# Patient Record
Sex: MALE | Race: BLACK OR AFRICAN AMERICAN | Employment: FULL TIME | URBAN - METROPOLITAN AREA
[De-identification: names, ages, dates, MRNs, and addresses within clinical notes are randomized per-mention and may not be internally consistent; named-entity substitution may affect disease eponyms.]

---

## 2018-05-01 ENCOUNTER — TRANSFERRED RECORDS (OUTPATIENT)
Dept: HEALTH INFORMATION MANAGEMENT | Facility: CLINIC | Age: 33
End: 2018-05-01

## 2019-01-13 ENCOUNTER — HOSPITAL ENCOUNTER (EMERGENCY)
Facility: CLINIC | Age: 34
Discharge: HOME | End: 2019-01-13
Attending: EMERGENCY MEDICINE | Admitting: EMERGENCY MEDICINE
Payer: COMMERCIAL

## 2019-01-13 ENCOUNTER — APPOINTMENT (OUTPATIENT)
Dept: GENERAL RADIOLOGY | Facility: CLINIC | Age: 34
End: 2019-01-13
Payer: COMMERCIAL

## 2019-01-13 VITALS
RESPIRATION RATE: 19 BRPM | TEMPERATURE: 100 F | OXYGEN SATURATION: 96 % | SYSTOLIC BLOOD PRESSURE: 102 MMHG | DIASTOLIC BLOOD PRESSURE: 56 MMHG | BODY MASS INDEX: 19.64 KG/M2 | HEIGHT: 72 IN | WEIGHT: 145 LBS | HEART RATE: 85 BPM

## 2019-01-13 DIAGNOSIS — J18.9 PNEUMONIA OF RIGHT LOWER LOBE DUE TO INFECTIOUS ORGANISM: ICD-10-CM

## 2019-01-13 LAB
FLUAV+FLUBV AG SPEC QL: NEGATIVE
FLUAV+FLUBV AG SPEC QL: NEGATIVE
SPECIMEN SOURCE: NORMAL

## 2019-01-13 PROCEDURE — 99285 EMERGENCY DEPT VISIT HI MDM: CPT | Mod: 25

## 2019-01-13 PROCEDURE — 93005 ELECTROCARDIOGRAM TRACING: CPT

## 2019-01-13 PROCEDURE — 25000132 ZZH RX MED GY IP 250 OP 250 PS 637: Performed by: EMERGENCY MEDICINE

## 2019-01-13 PROCEDURE — 71046 X-RAY EXAM CHEST 2 VIEWS: CPT

## 2019-01-13 PROCEDURE — 87804 INFLUENZA ASSAY W/OPTIC: CPT | Performed by: EMERGENCY MEDICINE

## 2019-01-13 RX ORDER — LEVOFLOXACIN 750 MG/1
750 TABLET, FILM COATED ORAL DAILY
Qty: 5 TABLET | Refills: 0 | Status: SHIPPED | OUTPATIENT
Start: 2019-01-13 | End: 2019-01-18

## 2019-01-13 RX ORDER — IBUPROFEN 600 MG/1
600 TABLET, FILM COATED ORAL ONCE
Status: COMPLETED | OUTPATIENT
Start: 2019-01-13 | End: 2019-01-13

## 2019-01-13 RX ORDER — ACETAMINOPHEN 500 MG
1000 TABLET ORAL ONCE
Status: COMPLETED | OUTPATIENT
Start: 2019-01-13 | End: 2019-01-13

## 2019-01-13 RX ADMIN — ACETAMINOPHEN 1000 MG: 500 TABLET, FILM COATED ORAL at 10:39

## 2019-01-13 RX ADMIN — IBUPROFEN 600 MG: 600 TABLET ORAL at 09:47

## 2019-01-13 SDOH — HEALTH STABILITY: MENTAL HEALTH: HOW OFTEN DO YOU HAVE A DRINK CONTAINING ALCOHOL?: NEVER

## 2019-01-13 ASSESSMENT — ENCOUNTER SYMPTOMS
COUGH: 1
CHILLS: 1
ARTHRALGIAS: 1

## 2019-01-13 ASSESSMENT — MIFFLIN-ST. JEOR: SCORE: 1635.72

## 2019-01-13 NOTE — ED TRIAGE NOTES
Pt c/o cough for 2+ days, productive of yellow phlegm. Today at 0500 pt awoke, c/o severe right sided chest wall pain.

## 2019-01-13 NOTE — ED PROVIDER NOTES
History     Chief Complaint:  Cough     HPI   Steve Colon is a 33 year old male who presents to the Emergency Department today for evaluation of cough and rib pain. The patient reports that he woke up at 5 AM this morning with pain in his right side over his ribs. It hurt every time he took a breath. The area does not hurt to touch. He has had a cough for the last couple of days. This morning he was subjectively feverish and chilled. No rash. Patient [place] not traveled outside the country [place] and has been around passengers the last couple of days. No one around him is sick.    Allergies:  No known drug allergies    Medications:    The patient is not currently taking any prescribed medications.    Past Medical History:    The patient does not have any past pertinent medical history.    Past Surgical History:    History reviewed. No pertinent surgical history.    Family History:    History reviewed. No pertinent family history.     Social History:  Smoking status: Never smoker  Alcohol use: No  Marital Status:   [2]     Review of Systems   Constitutional: Positive for chills.   Respiratory: Positive for cough.    Musculoskeletal: Positive for arthralgias.   All other systems reviewed and are negative.      Physical Exam     Patient Vitals for the past 24 hrs:   BP Temp Temp src Pulse Resp SpO2 Height Weight   01/13/19 0940 109/64 100  F (37.8  C) Temporal 107 18 96 % 1.829 m (6') 65.8 kg (145 lb)       Physical Exam   Constitutional: He appears well-developed and well-nourished.   HENT:   Right Ear: External ear normal.   Left Ear: External ear normal.   Mouth/Throat: Oropharynx is clear and moist. No oropharyngeal exudate.   TM's clear bilaterally   Eyes: Conjunctivae are normal. Pupils are equal, round, and reactive to light. No scleral icterus.   Neck: Normal range of motion. Neck supple.   Cardiovascular: Normal rate, regular rhythm, normal heart sounds and intact distal  pulses. Exam reveals no gallop and no friction rub.   No murmur heard.  Pulmonary/Chest: Effort normal. No respiratory distress. He has no wheezes. He has no rales.   Slight dec BS on R lower lung field   Abdominal: Soft. Bowel sounds are normal. He exhibits no distension and no mass. There is no tenderness.   Musculoskeletal: He exhibits no edema.   Neurological: He is alert.   Skin: Skin is warm and dry. Capillary refill takes less than 2 seconds. No rash noted.   Psychiatric: He has a normal mood and affect.       Emergency Department Course     ECG (10:42:38):  Rate 82 bpm. UT interval 164. QRS duration 78. QT/QTc 356/415. P-R-T axes 79 11 60. Normal sinus rhythm with sinus arrhythmia. Normal ECG. Interpreted at 1038 by Pankaj Lara MD.    Imaging:  Radiographic findings were communicated with the patient who voiced understanding of the findings.  Chest XR, PA & LAT  IMPRESSION: There is subtle patchy airspace opacity in the right lower  lobe that may represent pneumonia. The lungs are otherwise clear.  There is no pleural effusion or pneumothorax. Heart size is normal. As read by radiology.    Laboratory:  Influenza A/B antigen: Negative    Interventions:  0947: Ibuprofen 600 MG PO  1039: Acetaminophen 1000 MG PO    Emergency Department Course:  Past medical records, nursing notes, and vitals reviewed.  1027: I performed an exam of the patient and obtained history, as documented above.    Influenza antigen tested.    The patient was sent for a chest x-ray while in the emergency department, findings above.    1118: I rechecked the patient. Explained findings to the patient. Patient's repeat heart rate ws 78.    Findings and plan explained to the Patient. Patient discharged home with instructions regarding supportive care, medications, and reasons to return. The importance of close follow-up was reviewed.      Impression & Plan      Medical Decision Making:  This 33 year old previously healthy male presents  with cough, fever, and right-sided chest pain. His ECG is normal. X-ray show right lower lobe pneumonia that would go along with his symptoms. He tested negative for influenza. I discussed antibiotic usage with the patient. We will go ahead and start him on Levaquin for 5 days. He is aware that he needs to use probiotics with the antibiotics. He was also made aware of the tendinopathy risks. He is otherwise healthy and not immunocompromised so I thought it was low risk but he is aware if he starts having any soreness he needs to stop right away and call his clinic to get followed up and get switched to different antibiotics. He otherwise has no respiratory distress or any deterioration. His heart rate is now 78 after Tylenol. He is asked to push fluids and rest. He is aware of tendonopathy risk for Levaquin. He is referred to [facility] for follow up.     Diagnosis:    ICD-10-CM   1. Pneumonia of right lower lobe due to infectious organism (H) J18.1     Disposition:  discharged to home    Discharge Medications:     Medication List      Started    levofloxacin 750 MG tablet  Commonly known as:  LEVAQUIN  750 mg, Oral, DAILY          Radha Franco  1/13/2019   [facility] EMERGENCY DEPARTMENT  Scribe Disclosure:  IRadha, am serving as a scribe at 10:27 AM on 1/13/2019 to document services personally performed by Pankaj Lara MD based on my observations and the provider's statements to me.        Pankaj Lara MD  01/13/19 7866

## 2019-01-13 NOTE — ED AVS SNAPSHOT
[facility] Emergency Department  [address]  [facility] [address]  Phone:  [phone]  Fax:  [phone]                                    Steve Colon   MRN: [id number]    Department:  [facility] Emergency Department   Date of Visit:  1/13/2019           After Visit Summary Signature Page    I have received my discharge instructions, and my questions have been answered. I have discussed any challenges I see with this plan with the nurse or doctor.    ..........................................................................................................................................  Patient/Patient Representative Signature      ..........................................................................................................................................  Patient Representative Print Name and Relationship to Patient    ..................................................               ................................................  Date                                   Time    ..........................................................................................................................................  Reviewed by Signature/Title    ...................................................              ..............................................  Date                                               Time          22EPIC Rev 08/18

## 2019-01-14 LAB — INTERPRETATION ECG - MUSE: NORMAL

## 2019-08-29 ENCOUNTER — HOSPITAL ENCOUNTER (EMERGENCY)
Facility: CLINIC | Age: 34
Discharge: HOME | End: 2019-08-29
Attending: EMERGENCY MEDICINE | Admitting: EMERGENCY MEDICINE
Payer: COMMERCIAL

## 2019-08-29 ENCOUNTER — APPOINTMENT (OUTPATIENT)
Dept: GENERAL RADIOLOGY | Facility: CLINIC | Age: 34
End: 2019-08-29
Attending: EMERGENCY MEDICINE
Payer: COMMERCIAL

## 2019-08-29 VITALS
RESPIRATION RATE: 16 BRPM | WEIGHT: 145 LBS | HEIGHT: 72 IN | DIASTOLIC BLOOD PRESSURE: 74 MMHG | OXYGEN SATURATION: 100 % | TEMPERATURE: 98.4 F | HEART RATE: 69 BPM | SYSTOLIC BLOOD PRESSURE: 110 MMHG | BODY MASS INDEX: 19.64 KG/M2

## 2019-08-29 DIAGNOSIS — M25.461 EFFUSION OF RIGHT KNEE: ICD-10-CM

## 2019-08-29 DIAGNOSIS — M25.561 ACUTE PAIN OF RIGHT KNEE: ICD-10-CM

## 2019-08-29 PROCEDURE — 99284 EMERGENCY DEPT VISIT MOD MDM: CPT

## 2019-08-29 PROCEDURE — 25000132 ZZH RX MED GY IP 250 OP 250 PS 637: Performed by: EMERGENCY MEDICINE

## 2019-08-29 PROCEDURE — 29505 APPLICATION LONG LEG SPLINT: CPT | Mod: RT

## 2019-08-29 PROCEDURE — 73562 X-RAY EXAM OF KNEE 3: CPT | Mod: RT

## 2019-08-29 RX ORDER — HYDROCODONE BITARTRATE AND ACETAMINOPHEN 5; 325 MG/1; MG/1
1-2 TABLET ORAL EVERY 6 HOURS PRN
Qty: 10 TABLET | Refills: 0 | Status: SHIPPED | OUTPATIENT
Start: 2019-08-29 | End: 2019-09-01

## 2019-08-29 RX ORDER — IBUPROFEN 600 MG/1
600 TABLET, FILM COATED ORAL ONCE
Status: COMPLETED | OUTPATIENT
Start: 2019-08-29 | End: 2019-08-29

## 2019-08-29 RX ORDER — HYDROCODONE BITARTRATE AND ACETAMINOPHEN 5; 325 MG/1; MG/1
2 TABLET ORAL ONCE
Status: COMPLETED | OUTPATIENT
Start: 2019-08-29 | End: 2019-08-29

## 2019-08-29 RX ADMIN — HYDROCODONE BITARTRATE AND ACETAMINOPHEN 2 TABLET: 5; 325 TABLET ORAL at 01:15

## 2019-08-29 RX ADMIN — IBUPROFEN 600 MG: 600 TABLET ORAL at 01:15

## 2019-08-29 ASSESSMENT — MIFFLIN-ST. JEOR: SCORE: 1635.72

## 2019-08-29 ASSESSMENT — ENCOUNTER SYMPTOMS
JOINT SWELLING: 1
ARTHRALGIAS: 1

## 2019-08-29 NOTE — ED PROVIDER NOTES
History     Chief Complaint:  right knee pain      HPI   Steve Colon is a 33 year old male who presents with right knee pain. The patient says that he was racing his son outside around 1930 when he fell and injured his right knee. The patient says that there was immediate pain and swelling. The patient says that there is severe pain when bending or moving his knee. He denies any numbness, tingling, or weakness. He denies the use of pain medications at home or any previous injuries to his knee.     Allergies:  No Known Drug Allergies     Medications:    Medications reviewed. No pertinent medications.     Past Medical History:    Past medical history reviewed. No pertinent medical history.     Past Surgical History:    Surgical history reviewed. No pertinent surgical history.     Family History:    Cancer     Social History:  Smoking Status: Current Smoker  Smokeless Tobacco: Never Used  Alcohol Use: Positive   Drug Use: Negative  Marital Status:       Review of Systems   Musculoskeletal: Positive for arthralgias and joint swelling.   All other systems reviewed and are negative.        Physical Exam     Patient Vitals for the past 24 hrs:   BP Temp Temp src Heart Rate Resp SpO2 Height Weight   08/29/19 0043 107/67 98.4  F (36.9  C) Oral 84 22 100 % 1.829 m (6') 65.8 kg (145 lb)         Physical Exam  right Lower Extremity:  small effusion, 30 degrees flexion, 180 degrees extension, unable to perform due to pain lachmans,  posterior drawer,  laxity with LCL testing,  laxity with MCL testing,  pain with Pio's,  mechanical symptoms with Pio's, able to initiate extension at the knee, no palpable quadriceps or patella tendon defect,    CV: 2+ DP and PT pulses right lower ext  Neuro: sensation intact over distal  Skin: no break in skin, no redness    Emergency Department Course     Imaging:  Radiology findings were communicated with the patient who voiced understanding of the findings.    XR Knee Right 3  Views  No acute fracture or dislocation. Minimal joint fluid.  Reading per radiology    Interventions:  0115 ibuprofen 600 mg Oral  0115 Norco 2 tablets Oral    Emergency Department Course:    0045 Nursing notes and vitals reviewed.    0055 I performed an exam of the patient as documented above.     0103 The patient was sent for a XR while in the emergency department, results above.      0127 Patient rechecked and updated.      0158 The patient is discharged to home.     Impression & Plan      Medical Decision Making:  Steve Colon is a 33 year old male is a 33 year old male who presents for evaluation of acute knee pain.  There are no signs of fracture on knee xray.  There are no signs of a septic joint or bursitis.  I have low suspicion for an occult tibial plateau fracture based on exam, xray and mechanism. The patients neurovascular status is normal.  I suspect based on exam a  meniscal injury or ligamentous injury.  Plan is for protected weightbearing, knee immobilizer RICE treatment and follow-up with primary or ortho in 5-7 days for reevaluation.      Diagnosis:    ICD-10-CM    1. Acute pain of right knee M25.561     possible ligament or meniscus injury   2. Effusion of right knee M25.461      Disposition:   Findings and plan explained to the Patient. Patient discharged home with instructions regarding supportive care, medications, and reasons to return. The importance of close follow-up was reviewed.     Discharge Medications:     Review of your medicines      START taking      Dose / Directions   HYDROcodone-acetaminophen 5-325 MG tablet  Commonly known as:  NORCO      Dose:  1-2 tablet  Take 1-2 tablets by mouth every 6 hours as needed for pain  Quantity:  10 tablet  Refills:  0           Where to get your medicines      Some of these will need a paper prescription and others can be bought over the counter. Ask your nurse if you have questions.    Bring a paper prescription for each of these  medications    HYDROcodone-acetaminophen 5-325 MG tablet         Scribe Disclosure:  I, Bruce Scott, am serving as a scribe at 12:55 AM on 8/29/2019 to document services personally performed by Tootie Combs based on my observations and the provider's statements to me.      [facility] EMERGENCY DEPARTMENT       Tootie Combs MD  08/29/19 0503

## 2019-08-29 NOTE — DISCHARGE INSTRUCTIONS
Wear the knee immobilizer until you follow up with orthopedics.  You may have a injury of the meniscus or one of the ligaments in the knee    Take ibuprofen 600 mg 3x per day.  This will provide both pain control and fight against inflammation.  You were given a prescription for norco (hydrocodone and acetaminophen).  This is a strong, narcotic pain medication.  It may cause drowsiness and mild changes in cognition.  Do not drive or operate machinery while taking this medication.  Do not mix this medication with alcohol or other sedating medications.  This medication contains tylenol (acetaminophen) therefore do not take additional tylenol (acetaminophen) while taking norco.  This medication can cause constipation.  The use of over the counter laxatives and stool softeners can help prevent this.    Opioid Medication Information    You have been given a prescription for an opioid (narcotic) pain medicine and/or have received a pain medicine while here in the Emergency Department. These medicines can make you drowsy or impaired. You must not drive, operate dangerous equipment, or engage in any other dangerous activities while taking these medications. If you drive while taking these medications, you could be arrested for driving under the influence (DUI). Do not drink any alcohol while you are taking these medications.     Opioid pain medications can cause addiction. If you have a history of chemical dependency of any type, you are at a higher risk of becoming addicted to pain medications.  Only take these prescribed medications to treat your pain when all other options have been tried. Take it for as short a time and as few doses as possible. Store your pain pills in a secure place, as they are frequently stolen and provide a dangerous opportunity for children or visitors in your house to start abusing these powerful medications. We will not replace any lost or stolen medicine.    If you do not finish your  medication, it is a good idea to get rid of it but please do not flush it down the toilet. Please dispose of the remaining medication at a local pharmacy or law enforcement facility. The [place] [employer] has additional information on medication disposal: https://www.pca.[facility].[place]/living-green/managing-unwanted-medications.      Many prescription pain medications contain Tylenol  (acetaminophen), including Vicodin , Tylenol #3 , Norco , Lortab , and Percocet .  You should not take any extra pills of Tylenol  if you are using these prescription medications or you can get very sick.  Do not ever take more than 3000 mg of acetaminophen in any 24 hour period.    All opioids tend to cause constipation. Drink plenty of water and eat foods that have a lot of fiber, such as fruits, vegetables, prune juice, apple juice and high fiber cereal.  Take a laxative if you don t move your bowels at least every other day. Miralax , Milk of Magnesia, Colace , or Senna  can be used to keep you regular.

## 2019-08-29 NOTE — ED AVS SNAPSHOT
[facility] Emergency Department  [address]  [facility] [address]  Phone:  [phone]  Fax:  [phone]                                    Steve Colon   MRN: [id number]    Department:  [facility] Emergency Department   Date of Visit:  8/29/2019           After Visit Summary Signature Page    I have received my discharge instructions, and my questions have been answered. I have discussed any challenges I see with this plan with the nurse or doctor.    ..........................................................................................................................................  Patient/Patient Representative Signature      ..........................................................................................................................................  Patient Representative Print Name and Relationship to Patient    ..................................................               ................................................  Date                                   Time    ..........................................................................................................................................  Reviewed by Signature/Title    ...................................................              ..............................................  Date                                               Time          22EPIC Rev 08/18